# Patient Record
Sex: FEMALE | Race: WHITE | HISPANIC OR LATINO | URBAN - METROPOLITAN AREA
[De-identification: names, ages, dates, MRNs, and addresses within clinical notes are randomized per-mention and may not be internally consistent; named-entity substitution may affect disease eponyms.]

---

## 2017-11-27 ENCOUNTER — EMERGENCY (EMERGENCY)
Facility: HOSPITAL | Age: 27
LOS: 1 days | Discharge: HOME | End: 2017-11-27
Attending: EMERGENCY MEDICINE | Admitting: EMERGENCY MEDICINE
Payer: SELF-PAY

## 2017-11-27 VITALS
TEMPERATURE: 99 F | SYSTOLIC BLOOD PRESSURE: 159 MMHG | HEIGHT: 65 IN | HEART RATE: 98 BPM | DIASTOLIC BLOOD PRESSURE: 87 MMHG | OXYGEN SATURATION: 98 % | WEIGHT: 293 LBS | RESPIRATION RATE: 15 BRPM

## 2017-11-27 DIAGNOSIS — S90.32XA CONTUSION OF LEFT FOOT, INITIAL ENCOUNTER: ICD-10-CM

## 2017-11-27 DIAGNOSIS — Z88.0 ALLERGY STATUS TO PENICILLIN: ICD-10-CM
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
## 2017-11-27 DIAGNOSIS — M25.572 PAIN IN LEFT ANKLE AND JOINTS OF LEFT FOOT: ICD-10-CM

## 2017-11-27 PROCEDURE — 99284 EMERGENCY DEPT VISIT MOD MDM: CPT | Mod: 25

## 2017-11-27 PROCEDURE — 73630 X-RAY EXAM OF FOOT: CPT | Mod: 26,LT

## 2017-11-27 PROCEDURE — 99283 EMERGENCY DEPT VISIT LOW MDM: CPT | Mod: 25

## 2017-11-27 PROCEDURE — 99053 MED SERV 10PM-8AM 24 HR FAC: CPT

## 2017-11-27 PROCEDURE — 73630 X-RAY EXAM OF FOOT: CPT

## 2017-11-27 NOTE — ED PROVIDER NOTE - OBJECTIVE STATEMENT
27yo female who presenst with left foot pain for a week. pt was at work and fell off a ladder, and landed on her left foot no head injury, pt was able to ambulate for several days but over the last few days pt has increased pain to foot, no tingling or numbness, no other copmalitns

## 2017-11-27 NOTE — ED ADULT TRIAGE NOTE - CHIEF COMPLAINT QUOTE
left foot injury at work on Wednesday, symptoms began Sunday with swelling, increased pain and difficulty ambulating

## 2017-11-27 NOTE — ED ADULT NURSE NOTE - OBJECTIVE STATEMENT
alert oriented x4 no distress c/o lt foot pain pt ambulating with slight swelling noted pt evaluated and taken to xray

## 2018-10-05 ENCOUNTER — OUTPATIENT (OUTPATIENT)
Dept: OUTPATIENT SERVICES | Facility: HOSPITAL | Age: 28
LOS: 1 days | End: 2018-10-05
Payer: MEDICAID

## 2018-10-05 DIAGNOSIS — R11.10 VOMITING, UNSPECIFIED: ICD-10-CM

## 2018-10-05 LAB — HCG UR QL: NEGATIVE — SIGNIFICANT CHANGE UP

## 2018-10-05 PROCEDURE — 88312 SPECIAL STAINS GROUP 1: CPT | Mod: 26

## 2018-10-05 PROCEDURE — 81025 URINE PREGNANCY TEST: CPT

## 2018-10-05 PROCEDURE — 88313 SPECIAL STAINS GROUP 2: CPT

## 2018-10-05 PROCEDURE — 88312 SPECIAL STAINS GROUP 1: CPT

## 2018-10-05 PROCEDURE — 88305 TISSUE EXAM BY PATHOLOGIST: CPT

## 2018-10-05 PROCEDURE — 88313 SPECIAL STAINS GROUP 2: CPT | Mod: 26

## 2018-10-05 PROCEDURE — 43239 EGD BIOPSY SINGLE/MULTIPLE: CPT

## 2018-10-05 PROCEDURE — 88305 TISSUE EXAM BY PATHOLOGIST: CPT | Mod: 26

## 2018-10-08 LAB — SURGICAL PATHOLOGY FINAL REPORT - CH: SIGNIFICANT CHANGE UP

## 2020-10-19 ENCOUNTER — EMERGENCY (EMERGENCY)
Facility: HOSPITAL | Age: 30
LOS: 1 days | Discharge: HOME | End: 2020-10-19
Attending: EMERGENCY MEDICINE | Admitting: EMERGENCY MEDICINE
Payer: MEDICAID

## 2020-10-19 VITALS
HEART RATE: 111 BPM | OXYGEN SATURATION: 99 % | TEMPERATURE: 99 F | WEIGHT: 293 LBS | DIASTOLIC BLOOD PRESSURE: 90 MMHG | SYSTOLIC BLOOD PRESSURE: 143 MMHG | RESPIRATION RATE: 16 BRPM | HEIGHT: 65 IN

## 2020-10-19 PROCEDURE — 99283 EMERGENCY DEPT VISIT LOW MDM: CPT

## 2020-10-19 RX ORDER — NEOMYCIN/POLYMYXIN B/HYDROCORT
4 SUSPENSION, DROPS(FINAL DOSAGE FORM)(ML) OTIC (EAR) ONCE
Refills: 0 | Status: COMPLETED | OUTPATIENT
Start: 2020-10-19 | End: 2020-10-19

## 2020-10-19 RX ORDER — CEFDINIR 250 MG/5ML
1 POWDER, FOR SUSPENSION ORAL
Qty: 20 | Refills: 0
Start: 2020-10-19 | End: 2020-10-28

## 2020-10-19 NOTE — ED ADULT TRIAGE NOTE - CHIEF COMPLAINT QUOTE
Patient complaining of left ear pain which initially started with headache 2 weeks denies taking any pain medication Patient complaining of left ear pain which initially started with headache 2 weeks denies taking any pain medication went to urgent care yesterday cannot find anything and was referred to ED

## 2020-10-19 NOTE — ED PROVIDER NOTE - PATIENT PORTAL LINK FT
You can access the FollowMyHealth Patient Portal offered by [facility] by registering at the following website: [email or web address]. By joining [employer]’s FollowMyHealth portal, you will also be able to view your health information using other applications (apps) compatible with our system.

## 2020-10-19 NOTE — ED PROVIDER NOTE - OBJECTIVE STATEMENT
30yo female who presents with ear pain for 2 weeks. pt c/o diffuse left ear pain, with intermittent cough and sore throat, no tinnitus, no fever, chills, pt has been taking motrin with no relief, pt was seen at urgent care yesterday and told they could not find an infection and because she was having headaches, she needed further eval at the ER

## 2020-10-19 NOTE — ED PROVIDER NOTE - NSFOLLOWUPINSTRUCTIONS_ED_ALL_ED_FT
Ear Infection    WHAT YOU NEED TO KNOW:    An ear infection is also called otitis media. An ear infection may be caused by blocked or swollen eustachian tubes. Eustachian tubes connect the middle ear to the back of the nose and throat. They drain fluid from the middle ear. With an ear infection, fluid builds up and is infected by germs. The germs grow easily in fluid trapped behind the eardrum.     DISCHARGE INSTRUCTIONS:    Call 911 or have someone call 911 for the following:   •You have a seizure.          Return to the emergency department if:   •You have a fever and a stiff neck.          Contact your healthcare provider if:   •Your ear pain gets worse or does not go away, even after treatment.      •The outside of your ear is red or swollen.      •You are vomiting or have diarrhea.      •You have fluid coming from your ear.      •You have questions or concerns about your condition or care.      Medicines:You may need any of the following:   •Acetaminophen decreases pain and fever. It is available without a doctor's order. Ask how much to take and how often to take it. Follow directions. Read the labels of all other medicines you are using to see if they also contain acetaminophen, or ask your doctor or pharmacist. Acetaminophen can cause liver damage if not taken correctly. Do not use more than 4 grams (4,000 milligrams) total of acetaminophen in one day.       •NSAIDs, such as ibuprofen, help decrease swelling, pain, and fever. This medicine is available with or without a doctor's order. NSAIDs can cause stomach bleeding or kidney problems in certain people. If you take blood thinner medicine, always ask your healthcare provider if NSAIDs are safe for you. Always read the medicine label and follow directions.      •Ear drops help treat your ear pain.      •Antibiotics help treat a bacterial infection that caused your ear infection.      •Take your medicine as directed. Contact your healthcare provider if you think your medicine is not helping or if you have side effects. Tell him or her if you are allergic to any medicine. Keep a list of the medicines, vitamins, and herbs you take. Include the amounts, and when and why you take them. Bring the list or the pill bottles to follow-up visits. Carry your medicine list with you in case of an emergency.      Heat or ice:   •Apply heat on your ear for 15 to 20 minutes, 3 to 4 times a day or as directed. Heat helps decrease pain.      •Apply ice on your ear for 15 to 20 minutes, 3 to 4 times a day for 2 days or as directed. Use an ice pack, or put crushed ice in a plastic bag. Cover it with a towel before you apply it to your ear. Ice decreases swelling and pain.      Prevent an ear infection:   •Wash your hands often. Use soap and water. Wash your hands after you use the bathroom, change a child's diapers, or sneeze. Wash your hands before you prepare or eat food.   Handwashing           •Stay away from people who are ill.  Some germs are easily and quickly spread through contact.       Return to work or school: You may return to work or school when your fever is gone.     Follow up with your healthcare provider as directed: Write down your questions so you remember to ask them during your visits.

## 2020-10-19 NOTE — ED PROVIDER NOTE - CARE PROVIDER_API CALL
Matthew Raphael  OTOLARYNGOLOGY  [address] [place] [address], [address]  [place] [address]  Phone: [phone]  Fax: [phone]  Follow Up Time:

## 2020-10-19 NOTE — ED ADULT NURSE NOTE - CHIEF COMPLAINT QUOTE
Patient complaining of left ear pain which initially started with headache 2 weeks denies taking any pain medication went to urgent care yesterday cannot find anything and was referred to ED

## 2020-10-20 RX ADMIN — Medication 4 DROP(S): at 00:33

## 2020-11-02 NOTE — ED PROVIDER NOTE - PRINCIPAL DIAGNOSIS
Anesthesia Pre Eval Note    Anesthesia ROS/Med Hx          Pulmonary Review:    Positive for sleep apnea     Cardiovascular Review:    Positive for hypertension  Positive for hyperlipidemia    End/Other Review:  Positive for diabetes  Positive for obesity   Positive for anemia      Relevant Problems   Anesthesia Problems   (+) JESU (obstructive sleep apnea)       Physical Exam     Airway   Mallampati: II  TM Distance: >3 FB    Cardiovascular    Cardio Rhythm: Regular    Head Assessment  Head assessment: Normocephalic    General Assessment  General Assessment: Alert and oriented    Dental Exam  Dental exam normal  Patient has:  Upper dentures    Pulmonary Exam    Patient Demonstrates:  Decreased breath sounds    Abdominal Exam    Patient Demonstrates:  Obese      Anesthesia Plan  No phone call attempted due to:  Anesthesia Schedule Change    ASA Status: 3    Anesthesia Type: MAC    Induction: Intravenous      Risks Discussed: Nausea, Sore Throat, Vomiting and Aspiration  Checklist  Reviewed: Lab Results, Patient Summary, Care Everywhere, Allergies, Past Med History, Medications, DNR Status, Beta Blocker Status, Nursing Notes, EKG, Consultations, Outside Records, NPO Status and Problem list    Informed Consent  The proposed anesthetic plan, including its risks and benefits, have been discussed with the Patient  - along with the risks and benefits of alternatives.  Questions were encouraged and answered and the patient and/or representative understands and agrees to proceed.     Blood Products: Not Anticipated    
Otitis media

## 2025-02-19 NOTE — ED ADULT NURSE NOTE - BREATH SOUNDS, MLM
Verbal/written post procedure instructions were given to patient/caregiver./Keep the cast/splint/dressing clean and dry.
Clear